# Patient Record
Sex: FEMALE | Race: WHITE | URBAN - METROPOLITAN AREA
[De-identification: names, ages, dates, MRNs, and addresses within clinical notes are randomized per-mention and may not be internally consistent; named-entity substitution may affect disease eponyms.]

---

## 2023-05-04 ENCOUNTER — APPOINTMENT (RX ONLY)
Dept: URBAN - METROPOLITAN AREA CLINIC 151 | Facility: CLINIC | Age: 63
Setting detail: DERMATOLOGY
End: 2023-05-04

## 2023-05-04 DIAGNOSIS — D18.0 HEMANGIOMA: ICD-10-CM

## 2023-05-04 DIAGNOSIS — L81.4 OTHER MELANIN HYPERPIGMENTATION: ICD-10-CM

## 2023-05-04 DIAGNOSIS — D22 MELANOCYTIC NEVI: ICD-10-CM

## 2023-05-04 DIAGNOSIS — L21.8 OTHER SEBORRHEIC DERMATITIS: ICD-10-CM

## 2023-05-04 DIAGNOSIS — L40.8 OTHER PSORIASIS: ICD-10-CM

## 2023-05-04 DIAGNOSIS — L82.1 OTHER SEBORRHEIC KERATOSIS: ICD-10-CM

## 2023-05-04 DIAGNOSIS — L259 CONTACT DERMATITIS AND OTHER ECZEMA, UNSPECIFIED CAUSE: ICD-10-CM

## 2023-05-04 PROBLEM — D22.5 MELANOCYTIC NEVI OF TRUNK: Status: ACTIVE | Noted: 2023-05-04

## 2023-05-04 PROBLEM — L23.9 ALLERGIC CONTACT DERMATITIS, UNSPECIFIED CAUSE: Status: ACTIVE | Noted: 2023-05-04

## 2023-05-04 PROBLEM — D18.01 HEMANGIOMA OF SKIN AND SUBCUTANEOUS TISSUE: Status: ACTIVE | Noted: 2023-05-04

## 2023-05-04 PROCEDURE — ? ADDITIONAL NOTES

## 2023-05-04 PROCEDURE — ? PRESCRIPTION MEDICATION MANAGEMENT

## 2023-05-04 PROCEDURE — ? FULL BODY SKIN EXAM

## 2023-05-04 PROCEDURE — 99204 OFFICE O/P NEW MOD 45 MIN: CPT

## 2023-05-04 PROCEDURE — ? COUNSELING

## 2023-05-04 PROCEDURE — ? TREATMENT REGIMEN

## 2023-05-04 PROCEDURE — ? PRESCRIPTION

## 2023-05-04 RX ORDER — HYDROCORTISONE 25 MG/G
CREAM TOPICAL
Qty: 30 | Refills: 3 | Status: ERX | COMMUNITY
Start: 2023-05-04

## 2023-05-04 RX ADMIN — HYDROCORTISONE: 25 CREAM TOPICAL at 00:00

## 2023-05-04 ASSESSMENT — LOCATION DETAILED DESCRIPTION DERM
LOCATION DETAILED: LEFT ANTERIOR DISTAL THIGH
LOCATION DETAILED: RIGHT CAVUM CONCHA
LOCATION DETAILED: LEFT MEDIAL UPPER BACK
LOCATION DETAILED: RIGHT INFERIOR MEDIAL MIDBACK
LOCATION DETAILED: LEFT CAVUM CONCHA
LOCATION DETAILED: MIDDLE STERNUM
LOCATION DETAILED: LEFT AXILLARY VAULT
LOCATION DETAILED: POSTERIOR MID-PARIETAL SCALP

## 2023-05-04 ASSESSMENT — LOCATION SIMPLE DESCRIPTION DERM
LOCATION SIMPLE: LEFT UPPER BACK
LOCATION SIMPLE: LEFT THIGH
LOCATION SIMPLE: LEFT AXILLARY VAULT
LOCATION SIMPLE: LEFT EAR
LOCATION SIMPLE: POSTERIOR SCALP
LOCATION SIMPLE: RIGHT LOWER BACK
LOCATION SIMPLE: CHEST
LOCATION SIMPLE: RIGHT EAR

## 2023-05-04 ASSESSMENT — LOCATION ZONE DERM
LOCATION ZONE: LEG
LOCATION ZONE: AXILLAE
LOCATION ZONE: TRUNK
LOCATION ZONE: SCALP
LOCATION ZONE: EAR

## 2023-05-04 NOTE — PROCEDURE: PRESCRIPTION MEDICATION MANAGEMENT
Plan: Patient states that she has had the rash for two weeks. Patient states that she already doesn?t use deodorant without aluminum. Patient states that if it happened before she would stop wearing deodorant for a few days and it would go away. Patient states that it?s only under one arm. Patient states that she has clobetasol at home and states that she used is a day and states that it helped but was worried if it should go under the arm. Advised patient that clobetasol under the arm with friction is too strong to use long term and can be used for one week at time. Advised patient that a lesser strength topical steroid can be prescribed to use more long term.
Detail Level: Zone
Render In Strict Bullet Format?: No
Plan: Advised patient that T/Sal shampoo can be used to wash the scalp at least three times a week leaving in for five minutes before rinsing out. Advised patient that a topical steroid solution clobetasol can be prescribed if it becomes more bothersome p.
Plan: Advised patient that hydrocortisone cream can be applied to help with itch and scale.

## 2023-05-04 NOTE — HPI: EVALUATION OF SKIN LESION(S)
Hpi Title: Evaluation of Skin Lesions
How Severe Are Your Spot(S)?: mild
Have Your Spot(S) Been Treated In The Past?: has not been treated
Additional History: Patient states that she has a new rash under her arms that she has been using clobetasol cream for a couple days.

## 2025-05-21 ENCOUNTER — APPOINTMENT (OUTPATIENT)
Dept: URBAN - METROPOLITAN AREA CLINIC 151 | Facility: CLINIC | Age: 65
Setting detail: DERMATOLOGY
End: 2025-05-21

## 2025-05-21 DIAGNOSIS — L82.1 OTHER SEBORRHEIC KERATOSIS: ICD-10-CM

## 2025-05-21 DIAGNOSIS — L30.4 ERYTHEMA INTERTRIGO: ICD-10-CM

## 2025-05-21 DIAGNOSIS — D18.0 HEMANGIOMA: ICD-10-CM

## 2025-05-21 DIAGNOSIS — L81.4 OTHER MELANIN HYPERPIGMENTATION: ICD-10-CM

## 2025-05-21 DIAGNOSIS — L82.0 INFLAMED SEBORRHEIC KERATOSIS: ICD-10-CM

## 2025-05-21 PROBLEM — D18.01 HEMANGIOMA OF SKIN AND SUBCUTANEOUS TISSUE: Status: ACTIVE | Noted: 2025-05-21

## 2025-05-21 PROBLEM — D23.72 OTHER BENIGN NEOPLASM OF SKIN OF LEFT LOWER LIMB, INCLUDING HIP: Status: ACTIVE | Noted: 2025-05-21

## 2025-05-21 PROCEDURE — ? FULL BODY SKIN EXAM

## 2025-05-21 PROCEDURE — ? OTHER

## 2025-05-21 PROCEDURE — ? TREATMENT REGIMEN

## 2025-05-21 PROCEDURE — 99213 OFFICE O/P EST LOW 20 MIN: CPT

## 2025-05-21 PROCEDURE — ? COUNSELING

## 2025-05-21 PROCEDURE — ? ADDITIONAL NOTES

## 2025-05-21 ASSESSMENT — LOCATION DETAILED DESCRIPTION DERM
LOCATION DETAILED: RIGHT INFERIOR MEDIAL MIDBACK
LOCATION DETAILED: LEFT AXILLARY VAULT
LOCATION DETAILED: RIGHT LATERAL SUPERIOR CHEST
LOCATION DETAILED: RIGHT MEDIAL UPPER BACK
LOCATION DETAILED: LEFT SUPERIOR UPPER BACK
LOCATION DETAILED: RIGHT INFERIOR CENTRAL MALAR CHEEK
LOCATION DETAILED: LEFT ANTERIOR DISTAL UPPER ARM
LOCATION DETAILED: LEFT INFERIOR CENTRAL MALAR CHEEK

## 2025-05-21 ASSESSMENT — LOCATION ZONE DERM
LOCATION ZONE: AXILLAE
LOCATION ZONE: ARM
LOCATION ZONE: TRUNK
LOCATION ZONE: FACE

## 2025-05-21 ASSESSMENT — LOCATION SIMPLE DESCRIPTION DERM
LOCATION SIMPLE: LEFT AXILLARY VAULT
LOCATION SIMPLE: RIGHT LOWER BACK
LOCATION SIMPLE: CHEST
LOCATION SIMPLE: LEFT CHEEK
LOCATION SIMPLE: LEFT UPPER BACK
LOCATION SIMPLE: LEFT UPPER ARM
LOCATION SIMPLE: RIGHT CHEEK
LOCATION SIMPLE: RIGHT UPPER BACK

## 2025-05-21 NOTE — PROCEDURE: OTHER
Render Risk Assessment In Note?: no
Other (Free Text): DDX: Allergic contact dermatitis vs erythema. Patient states she has been using the same deodorant but feels she getting a reaction under left axilla. Advised to patient to try to keep the area as dry as possible.  Patient can start with OTC hydrocortisone twice daily as needed.
Note Text (......Xxx Chief Complaint.): This diagnosis correlates with the
Detail Level: Zone